# Patient Record
(demographics unavailable — no encounter records)

---

## 2024-10-11 NOTE — ASSESSMENT
[FreeTextEntry1] : We discussed her options. She is recovering from a knee and elbow problem and cannot consider L SH surgery. Prescribed PT. L SH CSI is elected today. She has upcoming cataract surgery, so we will defer for now. She understands that delay in repair could adversely affect her outcome. Questions addressed. Follow up 3 weeks.  Patient seen by Dr. Felton Chritsensen, who determined the assessment and plan. Luli AGUILAR participated in the care of the patient, including the history and physical exam. I, Nuzhat Baptiste, am scribing for Dr. Felton Christensen in his presence for the chief complaint, physical exam, studies, assessment, and/or plan.

## 2024-10-11 NOTE — DATA REVIEWED
[FreeTextEntry1] : LEFT SHOULDER MRI ([facility]) 3/8/24: There is a medium full-thickness rotator cuff tear. The AC has minor changes. The biceps is ok. The muscle is good. There is some chondral changes at the GH joint.

## 2024-10-11 NOTE — IMAGING
[Left] : left shoulder [FreeTextEntry1] : 2V: The inferior HH spurring is the same.  There are minor AC changes.  The GH space is OK. [FreeTextEntry5] : 2V: There is a Type II acromion with spur.  The AHI is unchanged.

## 2024-10-11 NOTE — REASON FOR VISIT
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  No injury.  She saw Dr. Rivera, had an injection and was sent for PT.  An MRI was done.  She did OK.  No numbness.  Sleeping can be sore.  She does not need Advil at this point.   The PT had been helpful.  We last saw her in March 2024.  She is stable.  There are no new issues.

## 2024-10-11 NOTE — PHYSICAL EXAM
[Left] : left shoulder [Sitting] : sitting [Mild] : mild [5 ___] : forward flexion 5[unfilled]/5 [5___] : external rotation 5[unfilled]/5 [Right] : right shoulder [] : no sensory deficits [FreeTextEntry8] : These are mild.  [de-identified] : There is good strength with minimal discomfort.  [de-identified] : The belly press is mildly positive. The impingement is mild today. [FreeTextEntry9] : ROM: 165/60/T12 [TWNoteComboBox4] : passive forward flexion 165 degrees [de-identified] : external rotation 60 degrees

## 2024-10-11 NOTE — HISTORY OF PRESENT ILLNESS
[de-identified] : Here for left shoulder follow up. No changes in pain/symptoms since last visit. Completed PT. Would like to return to work.

## 2025-03-26 NOTE — HISTORY OF PRESENT ILLNESS
[FreeTextEntry1] : 62F with HTN presents for f/u Sent in by , also my pt Dr [place]ble [place]ows PMD: Dr Jian Wood in [place]  Previously, pt was initially seen 12/20 with stage II HTN, ? new dx, at that time pt without symptoms of HTN, EKG and TTE without evidence of LVH, pt started on Norvasc 10, however developed LE edema and was switched to lisinopril 5. 2/21 feeling well, tolerating lisinopril 5, states bp range in 120s-130s systolic. denies L edema, denies cp, SOB, HA, blurry vision. pt does endorse mild PORTILLO. pt sent for calcium score which was zero. pt has not been seen here since 2/2021. 12/22, feeling well on losartan 25. last seen 8/23, feeling well  pt now presents for follow up. today,  pt feeling well. pt states bp has been controlled at home. pt checks regularly. pt on losartan 25. pt denies cp or sob. LE edema, palpitations, dizziness, syncope. pt states bp has been ok at home, forgot to take her meds tday  Exercise: trying to go walking. limited, broke her patella last year.  Diet: regular diet, vegetarian Prior cardiac workup: none Recent labs:  EKG: SR 69 bpm  Med hx: osteopenia OBGYN hx: 2 children, 2 boys, denies prev hx of preeclampsia, gest htn, gest dm, or peripartum cardiomyopathy. premature menopause. 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), denies tob/etoh/drug Meds: losartan 25 multivitamin Allergies: pcn (rash), sulfa (rash), iodine

## 2025-03-26 NOTE — HISTORY OF PRESENT ILLNESS
[FreeTextEntry1] : 62F with HTN presents for f/u Sent in by , also my pt Dr [place] Meadows PMD: Dr Jian Wood in [place]  Previously, pt was initially seen 12/20 with stage II HTN, ? new dx, at that time pt without symptoms of HTN, EKG and TTE without evidence of LVH, pt started on Norvasc 10, however developed LE edema and was switched to lisinopril 5. 2/21 feeling well, tolerating lisinopril 5, states bp range in 120s-130s systolic. denies L edema, denies cp, SOB, HA, blurry vision. pt does endorse mild PORTILLO. pt sent for calcium score which was zero. pt has not been seen here since 2/2021. 12/22, feeling well on losartan 25. last seen 8/23, feeling well  pt now presents for follow up. today,  pt feeling well. pt states bp has been controlled at home. pt checks regularly. pt on losartan 25. pt denies cp or sob. LE edema, palpitations, dizziness, syncope. pt states bp has been ok at home, forgot to take her meds tday  Exercise: trying to go walking. limited, broke her patella last year.  Diet: regular diet, vegetarian Prior cardiac workup: none Recent labs:  EKG: SR 69 bpm  Med hx: osteopenia OBGYN hx: 2 children, 2 boys, denies prev hx of preeclampsia, gest htn, gest dm, or peripartum cardiomyopathy. premature menopause. 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

## 2025-03-26 NOTE — DISCUSSION/SUMMARY
[FreeTextEntry1] : 62F with HTN presents for f/u  HTN -controlled on losartan 25, will cont -d/w home bp measurements -cont diet and exercise as way to improve BP -euvolemic, comfortable appearing, no complaints  f/u 6 months or sooner if needed 40 min spent on complete encounter  [EKG obtained to assist in diagnosis and management of assessed problem(s)] : EKG obtained to assist in diagnosis and management of assessed problem(s)

## 2025-03-26 NOTE — REVIEW OF SYSTEMS
[Fever] : no fever [Headache] : no headache [Weight Gain (___ Lbs)] : no recent weight gain [Chills] : no chills [Feeling Fatigued] : not feeling fatigued [Weight Loss (___ Lbs)] : no recent weight loss [Blurry Vision] : no blurred vision [Sore Throat] : no sore throat [SOB] : no shortness of breath [Dyspnea on exertion] : not dyspnea during exertion [Chest Discomfort] : no chest discomfort [Lower Ext Edema] : no extremity edema [Palpitations] : no palpitations [Orthopnea] : no orthopnea [Syncope] : no syncope [Cough] : no cough [Wheezing] : no wheezing [Abdominal Pain] : no abdominal pain [Nausea] : no nausea [Vomiting] : no vomiting [Dizziness] : no dizziness [Confusion] : no confusion was observed [Easy Bleeding] : no tendency for easy bleeding [Easy Bruising] : no tendency for easy bruising